# Patient Record
Sex: MALE | Race: WHITE
[De-identification: names, ages, dates, MRNs, and addresses within clinical notes are randomized per-mention and may not be internally consistent; named-entity substitution may affect disease eponyms.]

---

## 2018-09-24 ENCOUNTER — HOSPITAL ENCOUNTER (EMERGENCY)
Dept: HOSPITAL 61 - ER | Age: 28
Discharge: HOME | End: 2018-09-24
Payer: COMMERCIAL

## 2018-09-24 VITALS — DIASTOLIC BLOOD PRESSURE: 93 MMHG | SYSTOLIC BLOOD PRESSURE: 138 MMHG

## 2018-09-24 VITALS — BODY MASS INDEX: 21.2 KG/M2 | WEIGHT: 160 LBS | HEIGHT: 73 IN

## 2018-09-24 DIAGNOSIS — Z88.6: ICD-10-CM

## 2018-09-24 DIAGNOSIS — L23.7: Primary | ICD-10-CM

## 2018-09-24 DIAGNOSIS — J45.909: ICD-10-CM

## 2018-09-24 PROCEDURE — 99283 EMERGENCY DEPT VISIT LOW MDM: CPT

## 2018-09-24 NOTE — PHYS DOC
Past Medical History


Past Medical History:  Asthma


Additional Past Surgical Histo:  Sternal surgery


Alcohol Use:  None


Drug Use:  None





Adult General


Chief Complaint


Chief Complaint:  SKIN RASH/ABSCESS





HPI


HPI





Patient is a 28 year old male who presents with his wife to the ED with a skin 

rash. He states that he and his wife cut down a pine tree on two days ago and 

were burning wood. Yesterday, they began experiencing swelling, itching, and 

redness in all areas of their bodies that were exposed (face, arms, legs, and 

area around their necks). He denies any shortness of breath, chest pain, fevers

, chills, abdominal pain, nausea, vomiting, diarrhea, and constipation. He 

states they have been taking Benadryl, applying Cortisone ointment, and taking 

cold showers with a scrub meant for poison ivy. He notes these have been 

helping slightly. He notes they have had "poison ivy-like symptoms" in the past 

since moving into their new home over a year ago.





Review of Systems


Review of Systems





Constitutional: Denies fever or chills


Eyes: Denies change in visual acuity, redness, or eye pain


HENT: Denies nasal congestion or sore throat


Respiratory: Denies cough or shortness of breath


Cardiovascular: Denies chest pain or heart palpitations


GI: Denies abdominal pain, nausea, vomiting, bloody stools or diarrhea


: Denies dysuria or hematuria


Musculoskeletal: Denies back pain or joint pain


Integument: Notes generalized skin rash and swelling


Neurologic: Denies headache, focal weakness or sensory changes





Complete systems were reviewed and found to be within normal limits, except as 

documented in this note





Family History


Family History


Noncontributory





Current Medications


Current Medications





Current Medications








 Medications


  (Trade)  Dose


 Ordered  Sig/Lacey  Start Time


 Stop Time Status Last Admin


Dose Admin


 


 Dexamethasone


  (Decadron)  10 mg  1X  ONCE  9/24/18 04:15


 9/24/18 04:20 DC 9/24/18 05:06


10 MG











Allergies


Allergies





Allergies








Coded Allergies Type Severity Reaction Last Updated Verified


 


  aspirin Allergy Unknown swelling  9/24/18 Yes











Physical Exam


Physical Exam





Constitutional: Well developed, well nourished


HENT: Normocephalic, atraumatic, oropharynx moist


Eyes: Conjunctiva normal, no discharge


Neck: Normal range of motion, no tenderness, supple


Cardiovascular: Heart rate regular rhythm, no murmur


Lungs & Thorax:  Bilateral breath sounds clear to auscultation


Abdomen: Soft, nontender


Skin: Warm, dry, facial edema, generalized erythema  


Back: No tenderness, no CVA tenderness


Extremities: No tenderness,ROM intact, slight edema


Neurologic: Alert and oriented X 3, normal motor function, normal sensory 

function, no focal deficits noted


Psychologic: Affect normal, judgement normal, mood normal





Current Patient Data


Vital Signs





 Vital Signs








  Date Time  Temp Pulse Resp B/P (MAP) Pulse Ox O2 Delivery O2 Flow Rate FiO2


 


9/24/18 03:49 98.4 50 18 138/93 (108) 99 Room Air  





 98.4       











EKG


EKG


[]





Radiology/Procedures


Radiology/Procedures


[]





Course & Med Decision Making


Course & Med Decision Making





Patient is a 28 year old male who presents with his wife to the ED with a skin 

rash. The rash was evaluated. The etiology of the skin rash is unknown, but 

suspect possible poison ivy exposure. Patient advised to utilize Benadryl, 

cortisol ointment, and Aquaphor as needed. Provided a prescription for oral 

prednisone for 5 days, first dose provided in the ED. Patient stable for 

discharge with outpatient follow-up with PCP. Discussed findings and plan with 

patient who acknowledges understanding and agreement.





Dragon Disclaimer


Dragon Disclaimer


This electronic medical record was generated, in whole or in part, using a 

voice recognition dictation system.





Departure


Departure


Impression:  


 Primary Impression:  


 Poison ivy dermatitis


Disposition:  01 HOME, SELF-CARE


Condition:  STABLE


Referrals:  


BHAVNA MELARA MD (PCP)


Patient Instructions:  Poison Ivy, Easy-to-Read


Scripts


Prednisone (PREDNISONE) 20 Mg Tablet


2 TAB PO DAILY, #10 TAB


   Prov: ARACELIS YOU DO         9/24/18











ARACELIS YOU DO Sep 24, 2018 04:20